# Patient Record
Sex: FEMALE | NOT HISPANIC OR LATINO | URBAN - METROPOLITAN AREA
[De-identification: names, ages, dates, MRNs, and addresses within clinical notes are randomized per-mention and may not be internally consistent; named-entity substitution may affect disease eponyms.]

---

## 2021-04-07 ENCOUNTER — APPOINTMENT (RX ONLY)
Dept: URBAN - METROPOLITAN AREA CLINIC 368 | Facility: CLINIC | Age: 36
Setting detail: DERMATOLOGY
End: 2021-04-07

## 2021-04-07 DIAGNOSIS — L65.8 OTHER SPECIFIED NONSCARRING HAIR LOSS: ICD-10-CM

## 2021-04-07 DIAGNOSIS — L21.8 OTHER SEBORRHEIC DERMATITIS: ICD-10-CM

## 2021-04-07 PROCEDURE — ? TREATMENT REGIMEN

## 2021-04-07 PROCEDURE — 99203 OFFICE O/P NEW LOW 30 MIN: CPT

## 2021-04-07 PROCEDURE — ? PRESCRIPTION

## 2021-04-07 PROCEDURE — ? COUNSELING

## 2021-04-07 RX ORDER — MINOXIDIL
POWDER (GRAM) MISCELLANEOUS
Qty: 1 | Refills: 2 | Status: ERX | COMMUNITY
Start: 2021-04-07

## 2021-04-07 RX ADMIN — Medication: at 00:00

## 2021-04-07 ASSESSMENT — LOCATION DETAILED DESCRIPTION DERM
LOCATION DETAILED: LEFT MEDIAL FRONTAL SCALP
LOCATION DETAILED: LEFT MEDIAL FOREHEAD
LOCATION DETAILED: POSTERIOR MID-PARIETAL SCALP

## 2021-04-07 ASSESSMENT — LOCATION SIMPLE DESCRIPTION DERM
LOCATION SIMPLE: LEFT SCALP
LOCATION SIMPLE: POSTERIOR SCALP
LOCATION SIMPLE: LEFT FOREHEAD

## 2021-04-07 ASSESSMENT — LOCATION ZONE DERM
LOCATION ZONE: SCALP
LOCATION ZONE: FACE

## 2021-04-07 NOTE — PROCEDURE: TREATMENT REGIMEN
Detail Level: Zone
Initiate Treatment: Topically:\\nApply 10% minoxidil, 0.1% Triamcinolone compound twice daily to only affected areas of the scalp for 4 weeks.\\nAfter 4 weeks, switch to once nightly. \\n\\nOrally: \\ntake Nutrafol supplement once daily. Take for at least 3 months.
Plan: Patient went to hair salon on March 14th and resulted in chemical hair burn / loss. Breakage and flaking X 3 weeks since the incident.
Initiate Treatment: OTC tea tree shampoo & minoxidil compound